# Patient Record
Sex: MALE | Race: WHITE | Employment: FULL TIME | URBAN - NONMETROPOLITAN AREA
[De-identification: names, ages, dates, MRNs, and addresses within clinical notes are randomized per-mention and may not be internally consistent; named-entity substitution may affect disease eponyms.]

---

## 2021-10-02 ENCOUNTER — APPOINTMENT (OUTPATIENT)
Dept: GENERAL RADIOLOGY | Age: 36
DRG: 177 | End: 2021-10-02
Payer: COMMERCIAL

## 2021-10-02 ENCOUNTER — HOSPITAL ENCOUNTER (INPATIENT)
Age: 36
LOS: 2 days | Discharge: HOME | DRG: 177 | End: 2021-10-04
Attending: EMERGENCY MEDICINE | Admitting: OPHTHALMOLOGY
Payer: COMMERCIAL

## 2021-10-02 DIAGNOSIS — U07.1 ACUTE HYPOXEMIC RESPIRATORY FAILURE DUE TO COVID-19 (HCC): Primary | ICD-10-CM

## 2021-10-02 DIAGNOSIS — J96.01 ACUTE HYPOXEMIC RESPIRATORY FAILURE DUE TO COVID-19 (HCC): Primary | ICD-10-CM

## 2021-10-02 LAB
ALBUMIN SERPL-MCNC: 4 G/DL (ref 3.5–5.1)
ALP BLD-CCNC: 57 U/L (ref 38–126)
ALT SERPL-CCNC: 65 U/L (ref 11–66)
ANION GAP SERPL CALCULATED.3IONS-SCNC: 14 MEQ/L (ref 8–16)
AST SERPL-CCNC: 52 U/L (ref 5–40)
BASOPHILS # BLD: 0.4 %
BASOPHILS ABSOLUTE: 0 THOU/MM3 (ref 0–0.1)
BILIRUB SERPL-MCNC: 0.6 MG/DL (ref 0.3–1.2)
BILIRUBIN DIRECT: < 0.2 MG/DL (ref 0–0.3)
BUN BLDV-MCNC: 20 MG/DL (ref 7–22)
C-REACTIVE PROTEIN: 8.99 MG/DL (ref 0–1)
CALCIUM SERPL-MCNC: 9.2 MG/DL (ref 8.5–10.5)
CHLORIDE BLD-SCNC: 97 MEQ/L (ref 98–111)
CO2: 25 MEQ/L (ref 23–33)
CREAT SERPL-MCNC: 0.9 MG/DL (ref 0.4–1.2)
EKG ATRIAL RATE: 104 BPM
EKG P AXIS: 38 DEGREES
EKG P-R INTERVAL: 144 MS
EKG Q-T INTERVAL: 344 MS
EKG QRS DURATION: 98 MS
EKG QTC CALCULATION (BAZETT): 452 MS
EKG R AXIS: -13 DEGREES
EKG T AXIS: 5 DEGREES
EKG VENTRICULAR RATE: 104 BPM
EOSINOPHIL # BLD: 0 %
EOSINOPHILS ABSOLUTE: 0 THOU/MM3 (ref 0–0.4)
ERYTHROCYTE [DISTWIDTH] IN BLOOD BY AUTOMATED COUNT: 12.3 % (ref 11.5–14.5)
ERYTHROCYTE [DISTWIDTH] IN BLOOD BY AUTOMATED COUNT: 41.6 FL (ref 35–45)
FERRITIN: 2468 NG/ML (ref 22–322)
FLU A ANTIGEN: NEGATIVE
FLU B ANTIGEN: NEGATIVE
GFR SERPL CREATININE-BSD FRML MDRD: > 90 ML/MIN/1.73M2
GLUCOSE BLD-MCNC: 211 MG/DL (ref 70–108)
GLUCOSE BLD-MCNC: 212 MG/DL (ref 70–108)
GLUCOSE BLD-MCNC: 337 MG/DL (ref 70–108)
HCT VFR BLD CALC: 43.2 % (ref 42–52)
HEMOGLOBIN: 14.9 GM/DL (ref 14–18)
IMMATURE GRANS (ABS): 0.03 THOU/MM3 (ref 0–0.07)
IMMATURE GRANULOCYTES: 0.4 %
LACTIC ACID: 1.3 MMOL/L (ref 0.5–2)
LD: 407 U/L (ref 100–190)
LYMPHOCYTES # BLD: 13 %
LYMPHOCYTES ABSOLUTE: 1 THOU/MM3 (ref 1–4.8)
MCH RBC QN AUTO: 31.5 PG (ref 26–33)
MCHC RBC AUTO-ENTMCNC: 34.5 GM/DL (ref 32.2–35.5)
MCV RBC AUTO: 91.3 FL (ref 80–94)
MONOCYTES # BLD: 6.5 %
MONOCYTES ABSOLUTE: 0.5 THOU/MM3 (ref 0.4–1.3)
NUCLEATED RED BLOOD CELLS: 0 /100 WBC
OSMOLALITY CALCULATION: 280.9 MOSMOL/KG (ref 275–300)
PLATELET # BLD: 184 THOU/MM3 (ref 130–400)
PMV BLD AUTO: 9.3 FL (ref 9.4–12.4)
POTASSIUM REFLEX MAGNESIUM: 3.6 MEQ/L (ref 3.5–5.2)
PRO-BNP: < 5 PG/ML (ref 0–450)
PROCALCITONIN: 0.13 NG/ML (ref 0.01–0.09)
RBC # BLD: 4.73 MILL/MM3 (ref 4.7–6.1)
SARS-COV-2, NAAT: DETECTED
SARS-COV-2, NAAT: NOT DETECTED
SEG NEUTROPHILS: 79.7 %
SEGMENTED NEUTROPHILS ABSOLUTE COUNT: 6.1 THOU/MM3 (ref 1.8–7.7)
SODIUM BLD-SCNC: 136 MEQ/L (ref 135–145)
SOURCE: NORMAL
TOTAL PROTEIN: 7.3 G/DL (ref 6.1–8)
TROPONIN T: < 0.01 NG/ML
WBC # BLD: 7.6 THOU/MM3 (ref 4.8–10.8)

## 2021-10-02 PROCEDURE — 6370000000 HC RX 637 (ALT 250 FOR IP): Performed by: PHYSICIAN ASSISTANT

## 2021-10-02 PROCEDURE — XW0DXM6 INTRODUCTION OF BARICITINIB INTO MOUTH AND PHARYNX, EXTERNAL APPROACH, NEW TECHNOLOGY GROUP 6: ICD-10-PCS | Performed by: PHYSICIAN ASSISTANT

## 2021-10-02 PROCEDURE — 83605 ASSAY OF LACTIC ACID: CPT

## 2021-10-02 PROCEDURE — 87635 SARS-COV-2 COVID-19 AMP PRB: CPT

## 2021-10-02 PROCEDURE — 6360000002 HC RX W HCPCS: Performed by: EMERGENCY MEDICINE

## 2021-10-02 PROCEDURE — 83615 LACTATE (LD) (LDH) ENZYME: CPT

## 2021-10-02 PROCEDURE — 2580000003 HC RX 258: Performed by: EMERGENCY MEDICINE

## 2021-10-02 PROCEDURE — 86140 C-REACTIVE PROTEIN: CPT

## 2021-10-02 PROCEDURE — 84484 ASSAY OF TROPONIN QUANT: CPT

## 2021-10-02 PROCEDURE — 80076 HEPATIC FUNCTION PANEL: CPT

## 2021-10-02 PROCEDURE — 93010 ELECTROCARDIOGRAM REPORT: CPT | Performed by: INTERNAL MEDICINE

## 2021-10-02 PROCEDURE — 99283 EMERGENCY DEPT VISIT LOW MDM: CPT

## 2021-10-02 PROCEDURE — 83880 ASSAY OF NATRIURETIC PEPTIDE: CPT

## 2021-10-02 PROCEDURE — 80048 BASIC METABOLIC PNL TOTAL CA: CPT

## 2021-10-02 PROCEDURE — 93005 ELECTROCARDIOGRAM TRACING: CPT | Performed by: EMERGENCY MEDICINE

## 2021-10-02 PROCEDURE — 96361 HYDRATE IV INFUSION ADD-ON: CPT

## 2021-10-02 PROCEDURE — 87804 INFLUENZA ASSAY W/OPTIC: CPT

## 2021-10-02 PROCEDURE — 99222 1ST HOSP IP/OBS MODERATE 55: CPT | Performed by: PHYSICIAN ASSISTANT

## 2021-10-02 PROCEDURE — 82948 REAGENT STRIP/BLOOD GLUCOSE: CPT

## 2021-10-02 PROCEDURE — 85025 COMPLETE CBC W/AUTO DIFF WBC: CPT

## 2021-10-02 PROCEDURE — 2580000003 HC RX 258: Performed by: PHYSICIAN ASSISTANT

## 2021-10-02 PROCEDURE — 96374 THER/PROPH/DIAG INJ IV PUSH: CPT

## 2021-10-02 PROCEDURE — 36415 COLL VENOUS BLD VENIPUNCTURE: CPT

## 2021-10-02 PROCEDURE — 82728 ASSAY OF FERRITIN: CPT

## 2021-10-02 PROCEDURE — 71045 X-RAY EXAM CHEST 1 VIEW: CPT

## 2021-10-02 PROCEDURE — 1200000003 HC TELEMETRY R&B

## 2021-10-02 PROCEDURE — 84145 PROCALCITONIN (PCT): CPT

## 2021-10-02 PROCEDURE — 6360000002 HC RX W HCPCS: Performed by: PHYSICIAN ASSISTANT

## 2021-10-02 RX ORDER — DEXAMETHASONE SODIUM PHOSPHATE 4 MG/ML
10 INJECTION, SOLUTION INTRA-ARTICULAR; INTRALESIONAL; INTRAMUSCULAR; INTRAVENOUS; SOFT TISSUE ONCE
Status: COMPLETED | OUTPATIENT
Start: 2021-10-02 | End: 2021-10-02

## 2021-10-02 RX ORDER — AMLODIPINE BESYLATE 5 MG/1
5 TABLET ORAL DAILY
COMMUNITY

## 2021-10-02 RX ORDER — VITAMIN B COMPLEX
2000 TABLET ORAL DAILY
Status: DISCONTINUED | OUTPATIENT
Start: 2021-10-02 | End: 2021-10-04 | Stop reason: HOSPADM

## 2021-10-02 RX ORDER — ESCITALOPRAM OXALATE 20 MG/1
30 TABLET ORAL NIGHTLY
COMMUNITY

## 2021-10-02 RX ORDER — SODIUM CHLORIDE, SODIUM LACTATE, POTASSIUM CHLORIDE, CALCIUM CHLORIDE 600; 310; 30; 20 MG/100ML; MG/100ML; MG/100ML; MG/100ML
INJECTION, SOLUTION INTRAVENOUS CONTINUOUS
Status: DISCONTINUED | OUTPATIENT
Start: 2021-10-02 | End: 2021-10-04 | Stop reason: HOSPADM

## 2021-10-02 RX ORDER — POLYETHYLENE GLYCOL 3350 17 G/17G
17 POWDER, FOR SOLUTION ORAL DAILY PRN
Status: DISCONTINUED | OUTPATIENT
Start: 2021-10-02 | End: 2021-10-04 | Stop reason: HOSPADM

## 2021-10-02 RX ORDER — ONDANSETRON 2 MG/ML
4 INJECTION INTRAMUSCULAR; INTRAVENOUS EVERY 6 HOURS PRN
Status: DISCONTINUED | OUTPATIENT
Start: 2021-10-02 | End: 2021-10-04 | Stop reason: HOSPADM

## 2021-10-02 RX ORDER — NICOTINE POLACRILEX 4 MG
15 LOZENGE BUCCAL PRN
Status: DISCONTINUED | OUTPATIENT
Start: 2021-10-02 | End: 2021-10-04 | Stop reason: HOSPADM

## 2021-10-02 RX ORDER — ZINC SULFATE 50(220)MG
50 CAPSULE ORAL DAILY
Status: DISCONTINUED | OUTPATIENT
Start: 2021-10-02 | End: 2021-10-04 | Stop reason: HOSPADM

## 2021-10-02 RX ORDER — ASCORBIC ACID 500 MG
500 TABLET ORAL DAILY
Status: DISCONTINUED | OUTPATIENT
Start: 2021-10-02 | End: 2021-10-04 | Stop reason: HOSPADM

## 2021-10-02 RX ORDER — ACETAMINOPHEN 650 MG/1
650 SUPPOSITORY RECTAL EVERY 6 HOURS PRN
Status: DISCONTINUED | OUTPATIENT
Start: 2021-10-02 | End: 2021-10-04 | Stop reason: HOSPADM

## 2021-10-02 RX ORDER — SODIUM CHLORIDE 0.9 % (FLUSH) 0.9 %
5-40 SYRINGE (ML) INJECTION EVERY 12 HOURS SCHEDULED
Status: DISCONTINUED | OUTPATIENT
Start: 2021-10-02 | End: 2021-10-04 | Stop reason: HOSPADM

## 2021-10-02 RX ORDER — SODIUM CHLORIDE 9 MG/ML
25 INJECTION, SOLUTION INTRAVENOUS PRN
Status: DISCONTINUED | OUTPATIENT
Start: 2021-10-02 | End: 2021-10-04 | Stop reason: HOSPADM

## 2021-10-02 RX ORDER — ONDANSETRON 4 MG/1
4 TABLET, ORALLY DISINTEGRATING ORAL EVERY 8 HOURS PRN
Status: DISCONTINUED | OUTPATIENT
Start: 2021-10-02 | End: 2021-10-04 | Stop reason: HOSPADM

## 2021-10-02 RX ORDER — DEXTROSE MONOHYDRATE 25 G/50ML
12.5 INJECTION, SOLUTION INTRAVENOUS PRN
Status: DISCONTINUED | OUTPATIENT
Start: 2021-10-02 | End: 2021-10-04 | Stop reason: HOSPADM

## 2021-10-02 RX ORDER — ESCITALOPRAM OXALATE 20 MG/1
20 TABLET ORAL NIGHTLY
Status: DISCONTINUED | OUTPATIENT
Start: 2021-10-02 | End: 2021-10-03

## 2021-10-02 RX ORDER — SODIUM CHLORIDE 9 MG/ML
1000 INJECTION, SOLUTION INTRAVENOUS CONTINUOUS
Status: DISCONTINUED | OUTPATIENT
Start: 2021-10-02 | End: 2021-10-04 | Stop reason: HOSPADM

## 2021-10-02 RX ORDER — SODIUM CHLORIDE 0.9 % (FLUSH) 0.9 %
5-40 SYRINGE (ML) INJECTION PRN
Status: DISCONTINUED | OUTPATIENT
Start: 2021-10-02 | End: 2021-10-04 | Stop reason: HOSPADM

## 2021-10-02 RX ORDER — DEXTROSE MONOHYDRATE 50 MG/ML
100 INJECTION, SOLUTION INTRAVENOUS PRN
Status: DISCONTINUED | OUTPATIENT
Start: 2021-10-02 | End: 2021-10-04 | Stop reason: HOSPADM

## 2021-10-02 RX ORDER — DEXAMETHASONE SODIUM PHOSPHATE 4 MG/ML
6 INJECTION, SOLUTION INTRA-ARTICULAR; INTRALESIONAL; INTRAMUSCULAR; INTRAVENOUS; SOFT TISSUE EVERY 24 HOURS
Status: DISCONTINUED | OUTPATIENT
Start: 2021-10-02 | End: 2021-10-02

## 2021-10-02 RX ORDER — GUAIFENESIN/DEXTROMETHORPHAN 100-10MG/5
5 SYRUP ORAL EVERY 4 HOURS PRN
Status: DISCONTINUED | OUTPATIENT
Start: 2021-10-02 | End: 2021-10-04 | Stop reason: HOSPADM

## 2021-10-02 RX ORDER — ACETAMINOPHEN 325 MG/1
650 TABLET ORAL EVERY 6 HOURS PRN
Status: DISCONTINUED | OUTPATIENT
Start: 2021-10-02 | End: 2021-10-04 | Stop reason: HOSPADM

## 2021-10-02 RX ORDER — LISINOPRIL AND HYDROCHLOROTHIAZIDE 20; 12.5 MG/1; MG/1
1 TABLET ORAL 2 TIMES DAILY
COMMUNITY

## 2021-10-02 RX ADMIN — Medication 5 ML: at 18:11

## 2021-10-02 RX ADMIN — INSULIN LISPRO 2 UNITS: 100 INJECTION, SOLUTION INTRAVENOUS; SUBCUTANEOUS at 19:07

## 2021-10-02 RX ADMIN — BARICITINIB 4 MG: 2 TABLET, FILM COATED ORAL at 18:10

## 2021-10-02 RX ADMIN — Medication 50 MG: at 18:11

## 2021-10-02 RX ADMIN — OXYCODONE HYDROCHLORIDE AND ACETAMINOPHEN 500 MG: 500 TABLET ORAL at 18:10

## 2021-10-02 RX ADMIN — ENOXAPARIN SODIUM 30 MG: 30 INJECTION, SOLUTION INTRAVENOUS; SUBCUTANEOUS at 21:57

## 2021-10-02 RX ADMIN — SODIUM CHLORIDE 1000 ML: 9 INJECTION, SOLUTION INTRAVENOUS at 13:31

## 2021-10-02 RX ADMIN — DEXAMETHASONE SODIUM PHOSPHATE 10 MG: 4 INJECTION, SOLUTION INTRAMUSCULAR; INTRAVENOUS at 13:31

## 2021-10-02 RX ADMIN — DEXAMETHASONE SODIUM PHOSPHATE 20 MG: 4 INJECTION, SOLUTION INTRAMUSCULAR; INTRAVENOUS at 18:10

## 2021-10-02 RX ADMIN — VITAMIN D, TAB 1000IU (100/BT) 2000 UNITS: 25 TAB at 18:11

## 2021-10-02 RX ADMIN — INSULIN LISPRO 2 UNITS: 100 INJECTION, SOLUTION INTRAVENOUS; SUBCUTANEOUS at 21:59

## 2021-10-02 ASSESSMENT — PAIN SCALES - GENERAL
PAINLEVEL_OUTOF10: 0
PAINLEVEL_OUTOF10: 0

## 2021-10-02 ASSESSMENT — ENCOUNTER SYMPTOMS
SORE THROAT: 0
RHINORRHEA: 0
CONSTIPATION: 0
SHORTNESS OF BREATH: 1
NAUSEA: 0
DIARRHEA: 1
ABDOMINAL PAIN: 0
VOMITING: 0
COUGH: 1

## 2021-10-02 NOTE — ED PROVIDER NOTES
Katy Parmar 13 COMPLAINT       Chief Complaint   Patient presents with    Shortness of Breath    Concern For COVID-19       Nurses Notes reviewed and I agree except as noted in the HPI. HISTORY OF PRESENT ILLNESS    Natasha Feliz is a 43 y.o. pleasant male who presents to the emergency department for shortness of breath and suspected Covid. Patient reports that 10 days ago he began to develop symptoms. He spoke with his PCP on call over the phone who recommended coming to the emergency department. He has had ongoing low-grade fever, has been taking Tylenol and ibuprofen alternating. Last few days he has had worsening of his difficulty breathing, has been coughing up more phlegm. Wife states pulse oximeter this morning was reading 85% at home. He has also had ongoing body aches, discomfort of cough, and felt sweaty. Denies nausea, vomiting, lymphadenopathy, rash. He has not been vaccinated for COVID-19. He has been having ongoing diarrhea. His father was found to be positive for Covid on September 20 when he was around his father on September 19. He has not been tested for COVID-19. No other additional complaints or concerns. REVIEW OF SYSTEMS     Review of Systems   Constitutional: Positive for fatigue and fever. Negative for diaphoresis. HENT: Negative for congestion, rhinorrhea and sore throat. Eyes: Negative for visual disturbance. Respiratory: Positive for cough and shortness of breath. Cardiovascular: Negative for chest pain, palpitations and leg swelling. Gastrointestinal: Positive for diarrhea. Negative for abdominal pain, constipation, nausea and vomiting. Endocrine: Negative for polyuria. Genitourinary: Negative for dysuria. Musculoskeletal: Positive for myalgias. Negative for joint swelling. Skin: Negative for rash.    Neurological: Negative for dizziness, seizures, syncope, speech difficulty, weakness, numbness and headaches. Hematological: Negative for adenopathy. Psychiatric/Behavioral: Negative for confusion and self-injury. All other systems reviewed and are negative. PAST MEDICAL HISTORY    has no past medical history on file. SURGICAL HISTORY      has a past surgical history that includes [place] tooth extraction. CURRENT MEDICATIONS       Previous Medications    No medications on file       ALLERGIES     has No Known Allergies. FAMILY HISTORY     He indicated that his mother is alive. He indicated that his father is alive. family history includes Diabetes in his paternal grandmother; Heart Disease in his paternal grandfather; Hypertension in his father. SOCIAL HISTORY      reports that he has been smoking. He does not have any smokeless tobacco history on file. He reports current alcohol use. He reports that he does not use drugs. PHYSICAL EXAM     INITIAL VITALS:  weight is 260 lb (117.9 kg). His oral temperature is 98.4 °F (36.9 °C). His blood pressure is 104/78 and his pulse is 97. His respiration is 22 and oxygen saturation is 93%. CONSTITUTIONAL: [Awake, alert, non toxic, well developed, well nourished, no acute distress, with pulling himself forward for exam, patient desats on NC to 88%, sitting still sats are 93% on NC]  HEAD: [Normocephalic, atraumatic]  EYES: [Pupils equal, round & reactive to light, extraocular movements intact, no nystagmus, clear conjunctiva, non-icteric sclera]  ENT: [External ear canal clear without evidence of cerumen impaction or foreign body, TM's clear without erythema or bulging. Nares patent without drainage, septum appears midline. Moist mucus membranes, oropharynx clear without exudate, erythema, or mass. Uvula midline]  NECK: [Nontender and supple. No meningismus, no appreciated lymphadenopathy. Intact full range of motion. C-spine midline without vertebral tenderness.  Trachea midline.]  CHEST: [Inspection normal, no lesions, equal minor errors which are inherent in voice recognition technology. **      Final report electronically signed by Dr. Javier Sanchez MD on 10/2/2021 12:31 PM        [] Visualized and interpreted by me   [x] Radiologist's Wet Read Report Reviewed   [] Discussed withRadiologist.    LABS:   Labs Reviewed   BASIC METABOLIC PANEL W/ REFLEX TO MG FOR LOW K - Abnormal; Notable for the following components:       Result Value    Chloride 97 (*)     Glucose 212 (*)     All other components within normal limits   CBC WITH AUTO DIFFERENTIAL - Abnormal; Notable for the following components:    MPV 9.3 (*)     All other components within normal limits   FERRITIN - Abnormal; Notable for the following components:    Ferritin 2,468 (*)     All other components within normal limits   C-REACTIVE PROTEIN - Abnormal; Notable for the following components:    CRP 8.99 (*)     All other components within normal limits   HEPATIC FUNCTION PANEL - Abnormal; Notable for the following components:    AST 52 (*)     All other components within normal limits   COVID-19, RAPID   RAPID INFLUENZA A/B ANTIGENS   BRAIN NATRIURETIC PEPTIDE   TROPONIN   ANION GAP   OSMOLALITY   GLOMERULAR FILTRATION RATE, ESTIMATED   LACTATE DEHYDROGENASE   COVID-19       EMERGENCY DEPARTMENT COURSE:   Vitals:    Vitals:    10/02/21 1130 10/02/21 1141 10/02/21 1336   BP:  110/67 104/78   Pulse:  104 97   Resp:  26 22   Temp:  98.4 °F (36.9 °C)    TempSrc:  Oral    SpO2: (!) 89% 92% 93%   Weight:  260 lb (117.9 kg)        The results of pertinent diagnostic studies and exam findings were discussed. The patients provisional diagnosis and plan of care were discussed with the patient and present family. The patient and/or present family expressed understanding of the diagnosis and plan. While asking patient to sit forward in the bed on NC oxygen, he desats to high 80's. While sitting still on NC his sats remain >90%.     CRITICAL CARE:   None    CONSULTS:  Hospitalist for admit    PROCEDURES:  None    FINAL IMPRESSION      1. Acute hypoxemic respiratory failure due to COVID-19 [facility])          DISPOSITION/PLAN   admit    PATIENT REFERRED TO:  No follow-up provider specified. DISCHARGE MEDICATIONS:  New Prescriptions    No medications on file       (Please note that portions of this note were completed with a voice recognition program.  Efforts were made to edit the dictations but occasionally words are mis-transcribed.)    Provider:  I personally performed the services described in the documentation, reviewed and edited the documentation which was dictated, and it accurately records my words and actions.     Vishal Hirsch MD 10/2/21 4:42 PM                Vishal Hirsch MD  10/02/21 1023

## 2021-10-02 NOTE — ED NOTES
ED to inpatient nurses report    Chief Complaint   Patient presents with    Shortness of Breath    Concern For COVID-19      Present to ED from home  LOC: alert and orientated to name, place, date  Vital signs   Vitals:    10/02/21 1130 10/02/21 1141 10/02/21 1336   BP:  110/67 104/78   Pulse:  104 97   Resp:  26 22   Temp:  98.4 °F (36.9 °C)    TempSrc:  Oral    SpO2: (!) 89% 92% 93%   Weight:  260 lb (117.9 kg)       Oxygen Baseline RA    Current needs required 4L NC Bipap/Cpap No  LDAs:   Peripheral IV 10/02/21 Left;Proximal;Anterior Forearm (Active)     Mobility: Independent  Pending ED orders: NA  Present condition: STABLE    Electronically signed by Ariela Hill, RN on 10/2/2021 at 3:00 PM       Liang Hope RN  10/02/21 1500

## 2021-10-02 NOTE — H&P
Hospitalist History and Physical          Patient: Jing Hernández  : 1985  MRN: [id number]     Acct: [de-identified]    PCP: Kimberley Austin  Date of Admission: 10/2/2021  Date of Service: Pt seen/examined on 10/02/21  and Admitted to Inpatient with expected LOS greater than two midnights due to medical therapy. Assessment and Plan:    Acute hypoxemic respiratory failure due 2/2 probable COVID-19 PNA:  Initial test was negative: repeat test ordered 2/2 suspicious of false negative  Troponin WNML, flu negative   CXR shows viral PNA picture, Ferritin 2,460  Currently on 2L NC   CRP 8.99- Baricitinib 4mg ordered  Decadron 6 mg IV   Vitamin D, Vitamin C, Zinc and IVF   Encourage acapella and incentive spirometer   Procalictonin, LDH, lactate- pending     Type II Diabetes: Oral medications held; Low SSI ordered; Carb control diet     Essential Hypertension, controlled: Resume Home medications     DISHA: Resume home medications if currently taking    Obesity: 35.26 kg/m2 - increased risk of [id number] complications        =======================================================================      Chief Complaint:  Shortness of breath     History Of Present Illness:  Jing Hernández is a 43 y.o. male with PMHx of type II diabetes, Essential hypertension, DISHA who presents to [facility] with shortness of breath and consitutional symptoms reflecting COVID19. Patient states he was around his father on   who was diagnosed positive on the . Patient began to experience low grade fevers, cough, headache, myalgias, nausea and diarrhea starting on the . His wife reports he was starting to feel better but today his oxygen saturation 85% on RA and was advised to come to the ED per his PCP. He states he's been taking tylenol and ibuprofen to manage his symptoms. He reports not being vaccinated for [address]. His cough is mildly productive with no hemoptysis.  He denies blurry vision, chest pain, hematuria, chills, numbness or tingling or dizziness. Past Medical History:    No past medical history on file. Past Surgical History:        Procedure Laterality Date    WISDOM TOOTH EXTRACTION         Medications Prior to Admission:   Prior to Admission medications    Not on File       Allergies:  Patient has no known allergies. Social History:    The patient currently lives    Tobacco use:   reports that he has been smoking. He does not have any smokeless tobacco history on file. Alcohol use:   reports current alcohol use. Drug use:  reports no history of drug use. Family History:  as follows:      Problem Relation Age of Onset    Diabetes Paternal Grandmother     Heart Disease Paternal Grandfather     Hypertension Father        Review of Systems:   Pertinent positives and negatives as noted in the HPI. All other systems reviewed and negative. Physical Exam:    /78   Pulse 97   Temp 98.4 °F (36.9 °C) (Oral)   Resp 22   Wt 260 lb (117.9 kg)   SpO2 93%   BMI 35.26 kg/m²       General appearance: No apparent distress, well developed, appears stated age. Eyes:  Pupils equal, round, and reactive to light. Conjunctivae/corneas clear. HENT: Head normal in appearance. External nares normal.  Oral mucosa moist without lesions. Hearing grossly intact. Neck: Supple, with full range of motion. Trachea midline. No gross JVD appreciated. Respiratory:  2L NC increased respiratory effort. Diminished breath sounds in lower lung fields with some intermitent rales on right side   Cardiovascular: Normal rate, regular rhythm with normal S1/S2 without murmurs. No lower extremity edema. Abdomen: Soft, non-tender, non-distended . Musculoskeletal: There is no joint swelling or tenderness. Normal tone. No abnormal movements. Skin: Warm and dry. No rashes or lesions. Neurologic:  No focal sensory/motor deficits in the upper and lower extremities.  Cranial nerves:  grossly non-focal 2-12.     Psychiatric: Alert and oriented, normal insight and thought content. Capillary Refill: Brisk,< 3 seconds. Peripheral Pulses: +2 palpable, equal bilaterally. Labs:     Recent Labs     10/02/21  1159   WBC 7.6   HGB 14.9   HCT 43.2        Recent Labs     10/02/21  1159      K 3.6   CL 97*   CO2 25   BUN 20   CREATININE 0.9   CALCIUM 9.2     Recent Labs     10/02/21  1159   AST 52*   ALT 65   BILIDIR <0.2   BILITOT 0.6   ALKPHOS 57     No results for input(s): INR in the last 72 hours. No results for input(s): Patrisha Meigs in the last 72 hours. No results found for: Klarissa Midget, BACTERIA, RBCUA, BLOODU, [address], [address]      Radiology:     XR CHEST PORTABLE   Final Result   Peripheral patchy opacities as evidence for atypical infectious/inflammatory process to include viral pneumonia. **This report has been created using voice recognition software. It may contain minor errors which are inherent in voice recognition technology. **      Final report electronically signed by Dr. Joel Pierre MD on 10/2/2021 12:31 PM             EKG:  I have reviewed the EKG with the following interpretation:   Normal Sinus Rhythm with ST depression in inferior leads       PT/OT Eval Status: n/a  Diet: Regular diet Carb control   DVT prophylaxis: 30 mg Lovenox   Code Status: Full code  Disposition: admit to isolation     Thank you Desmond Flowers for the opportunity to be involved in this patient's care.     Electronically signed by Elvis Montemayor [place] on 10/2/2021 at 3:14 PM.

## 2021-10-03 LAB
BASOPHILS # BLD: 0.2 %
BASOPHILS ABSOLUTE: 0 THOU/MM3 (ref 0–0.1)
EOSINOPHIL # BLD: 0 %
EOSINOPHILS ABSOLUTE: 0 THOU/MM3 (ref 0–0.4)
ERYTHROCYTE [DISTWIDTH] IN BLOOD BY AUTOMATED COUNT: 12.2 % (ref 11.5–14.5)
ERYTHROCYTE [DISTWIDTH] IN BLOOD BY AUTOMATED COUNT: 42.4 FL (ref 35–45)
GLUCOSE BLD-MCNC: 223 MG/DL (ref 70–108)
GLUCOSE BLD-MCNC: 243 MG/DL (ref 70–108)
GLUCOSE BLD-MCNC: 295 MG/DL (ref 70–108)
GLUCOSE BLD-MCNC: 347 MG/DL (ref 70–108)
HCT VFR BLD CALC: 45 % (ref 42–52)
HEMOGLOBIN: 15 GM/DL (ref 14–18)
IMMATURE GRANS (ABS): 0.06 THOU/MM3 (ref 0–0.07)
IMMATURE GRANULOCYTES: 1.5 %
LYMPHOCYTES # BLD: 26.4 %
LYMPHOCYTES ABSOLUTE: 1.1 THOU/MM3 (ref 1–4.8)
MCH RBC QN AUTO: 31.3 PG (ref 26–33)
MCHC RBC AUTO-ENTMCNC: 33.3 GM/DL (ref 32.2–35.5)
MCV RBC AUTO: 93.9 FL (ref 80–94)
MONOCYTES # BLD: 7 %
MONOCYTES ABSOLUTE: 0.3 THOU/MM3 (ref 0.4–1.3)
NUCLEATED RED BLOOD CELLS: 0 /100 WBC
PLATELET # BLD: 212 THOU/MM3 (ref 130–400)
PLATELET ESTIMATE: ADEQUATE
PMV BLD AUTO: 10.1 FL (ref 9.4–12.4)
RBC # BLD: 4.79 MILL/MM3 (ref 4.7–6.1)
SCAN OF BLOOD SMEAR: NORMAL
SEG NEUTROPHILS: 64.9 %
SEGMENTED NEUTROPHILS ABSOLUTE COUNT: 2.6 THOU/MM3 (ref 1.8–7.7)
WBC # BLD: 4 THOU/MM3 (ref 4.8–10.8)

## 2021-10-03 PROCEDURE — 6370000000 HC RX 637 (ALT 250 FOR IP): Performed by: PHYSICIAN ASSISTANT

## 2021-10-03 PROCEDURE — 6360000002 HC RX W HCPCS: Performed by: PHYSICIAN ASSISTANT

## 2021-10-03 PROCEDURE — 94669 MECHANICAL CHEST WALL OSCILL: CPT

## 2021-10-03 PROCEDURE — 86480 TB TEST CELL IMMUN MEASURE: CPT

## 2021-10-03 PROCEDURE — 2580000003 HC RX 258: Performed by: PHYSICIAN ASSISTANT

## 2021-10-03 PROCEDURE — 85025 COMPLETE CBC W/AUTO DIFF WBC: CPT

## 2021-10-03 PROCEDURE — 36415 COLL VENOUS BLD VENIPUNCTURE: CPT

## 2021-10-03 PROCEDURE — 1200000003 HC TELEMETRY R&B

## 2021-10-03 PROCEDURE — 99232 SBSQ HOSP IP/OBS MODERATE 35: CPT | Performed by: PHYSICIAN ASSISTANT

## 2021-10-03 PROCEDURE — 82948 REAGENT STRIP/BLOOD GLUCOSE: CPT

## 2021-10-03 PROCEDURE — 6370000000 HC RX 637 (ALT 250 FOR IP): Performed by: HOSPITALIST

## 2021-10-03 RX ORDER — LISINOPRIL AND HYDROCHLOROTHIAZIDE 20; 12.5 MG/1; MG/1
1 TABLET ORAL 2 TIMES DAILY
Status: DISCONTINUED | OUTPATIENT
Start: 2021-10-03 | End: 2021-10-03 | Stop reason: SDUPTHER

## 2021-10-03 RX ORDER — LISINOPRIL 20 MG/1
20 TABLET ORAL 2 TIMES DAILY
Status: DISCONTINUED | OUTPATIENT
Start: 2021-10-03 | End: 2021-10-04 | Stop reason: HOSPADM

## 2021-10-03 RX ORDER — HYDROCHLOROTHIAZIDE 12.5 MG/1
12.5 CAPSULE, GELATIN COATED ORAL 2 TIMES DAILY
Status: DISCONTINUED | OUTPATIENT
Start: 2021-10-03 | End: 2021-10-04 | Stop reason: HOSPADM

## 2021-10-03 RX ORDER — AMLODIPINE BESYLATE 5 MG/1
5 TABLET ORAL DAILY
Status: DISCONTINUED | OUTPATIENT
Start: 2021-10-03 | End: 2021-10-04 | Stop reason: HOSPADM

## 2021-10-03 RX ADMIN — HYDROCHLOROTHIAZIDE 12.5 MG: 12.5 CAPSULE ORAL at 15:55

## 2021-10-03 RX ADMIN — VITAMIN D, TAB 1000IU (100/BT) 2000 UNITS: 25 TAB at 07:58

## 2021-10-03 RX ADMIN — Medication 5 ML: at 08:00

## 2021-10-03 RX ADMIN — BARICITINIB 4 MG: 2 TABLET, FILM COATED ORAL at 07:58

## 2021-10-03 RX ADMIN — ESCITALOPRAM OXALATE 20 MG: 20 TABLET, FILM COATED ORAL at 00:29

## 2021-10-03 RX ADMIN — SODIUM CHLORIDE, POTASSIUM CHLORIDE, SODIUM LACTATE AND CALCIUM CHLORIDE: 600; 310; 30; 20 INJECTION, SOLUTION INTRAVENOUS at 14:16

## 2021-10-03 RX ADMIN — DEXAMETHASONE SODIUM PHOSPHATE 20 MG: 4 INJECTION, SOLUTION INTRAMUSCULAR; INTRAVENOUS at 17:48

## 2021-10-03 RX ADMIN — SODIUM CHLORIDE, POTASSIUM CHLORIDE, SODIUM LACTATE AND CALCIUM CHLORIDE: 600; 310; 30; 20 INJECTION, SOLUTION INTRAVENOUS at 00:19

## 2021-10-03 RX ADMIN — ENOXAPARIN SODIUM 30 MG: 30 INJECTION, SOLUTION INTRAVENOUS; SUBCUTANEOUS at 21:08

## 2021-10-03 RX ADMIN — ESCITALOPRAM OXALATE 30 MG: 10 TABLET, FILM COATED ORAL at 21:10

## 2021-10-03 RX ADMIN — INSULIN LISPRO 6 UNITS: 100 INJECTION, SOLUTION INTRAVENOUS; SUBCUTANEOUS at 13:11

## 2021-10-03 RX ADMIN — Medication 50 MG: at 07:58

## 2021-10-03 RX ADMIN — AMLODIPINE BESYLATE 5 MG: 5 TABLET ORAL at 15:36

## 2021-10-03 RX ADMIN — ENOXAPARIN SODIUM 30 MG: 30 INJECTION, SOLUTION INTRAVENOUS; SUBCUTANEOUS at 08:00

## 2021-10-03 RX ADMIN — INSULIN LISPRO 2 UNITS: 100 INJECTION, SOLUTION INTRAVENOUS; SUBCUTANEOUS at 09:33

## 2021-10-03 RX ADMIN — LISINOPRIL 20 MG: 20 TABLET ORAL at 15:55

## 2021-10-03 RX ADMIN — OXYCODONE HYDROCHLORIDE AND ACETAMINOPHEN 500 MG: 500 TABLET ORAL at 07:58

## 2021-10-03 RX ADMIN — INSULIN LISPRO 6 UNITS: 100 INJECTION, SOLUTION INTRAVENOUS; SUBCUTANEOUS at 21:23

## 2021-10-03 RX ADMIN — INSULIN LISPRO 9 UNITS: 100 INJECTION, SOLUTION INTRAVENOUS; SUBCUTANEOUS at 17:54

## 2021-10-03 ASSESSMENT — PAIN SCALES - GENERAL
PAINLEVEL_OUTOF10: 0

## 2021-10-03 NOTE — PROGRESS NOTES
Hospitalist Progress Note      Patient:  Terese Perez    Unit/Bed:8B-37/037-A  YOB: 1985  MRN: [id number]   Acct: [de-identified]   PCP: Chay Dwyer  Date of Admission: 10/2/2021    Assessment/Plan:    COVID 19: Patient tested positive for COVID-19 on 10/2. Pt met inpatient criteria. CRP level is 8.99. Due to his respiratory status and CRP level, patient is a candidate for baricitinib & decadron. Pharmacy consulted to help with dosages of these medications. Vitamins & supplements started. Acute respiratory failure, hypoxia: Patient's baseline is room air. Patient is currently requiring 3 L of oxygen. Incentive spirometry. Acapella. CXR shows \" peripheral patchy opacities\". Essential hypertension: Blood pressure is stable. Continue home medications. Noninsulin-dependent diabetes: Blood sugar has been elevated secondary to steroids. High dose SSI. Chief Complaint: Shortness of breath    Initial H and P:-    Initial H&P Bruna Ziegler is a 43 y.o. male with PMHx of type II diabetes, Essential hypertension, DISHA who presents to [facility] with shortness of breath and consitutional symptoms reflecting COVID19. Patient states he was around his father on September 19  who was diagnosed positive on the 20th. Patient began to experience low grade fevers, cough, headache, myalgias, nausea and diarrhea starting on the 24th. His wife reports he was starting to feel better but today his oxygen saturation 85% on RA and was advised to come to the ED per his PCP. He states he's been taking tylenol and ibuprofen to manage his symptoms. He reports not being vaccinated for [address]. His cough is mildly productive with no hemoptysis. He denies blurry vision, chest pain, hematuria, chills, numbness or tingling or dizziness. \"     Subjective (past 24 hours):   No acute events overnight. Patient sitting up in chair upon evaluation.   Patient and rhythm with normal S1/S2 without murmurs, rubs or gallops. Abdomen: Soft, non-tender, non-distended with normal bowel sounds. Musculoskeletal: passive and active ROM x 4 extremities. Skin: Skin color, texture, turgor normal.  No rashes or lesions. Neurologic:  Neurovascularly intact without any focal sensory/motor deficits. Cranial nerves: II-XII intact, grossly non-focal.  Psychiatric: Alert and oriented, thought content appropriate, normal insight  Capillary Refill: Brisk,< 3 seconds   Peripheral Pulses: +2 palpable, equal bilaterally     Labs:   Recent Labs     10/02/21  1159 10/03/21  0647   WBC 7.6 4.0*   HGB 14.9 15.0   HCT 43.2 45.0    212     Recent Labs     10/02/21  1159      K 3.6   CL 97*   CO2 25   BUN 20   CREATININE 0.9   CALCIUM 9.2     Recent Labs     10/02/21  1159   AST 52*   ALT 65   BILIDIR <0.2   BILITOT 0.6   ALKPHOS 57     Radiology:  XR CHEST PORTABLE   Final Result   Peripheral patchy opacities as evidence for atypical infectious/inflammatory process to include viral pneumonia. **This report has been created using voice recognition software. It may contain minor errors which are inherent in voice recognition technology. **      Final report electronically signed by Dr. Kirk Coleman MD on 10/2/2021 12:31 PM        Electronically signed by MARLY Blancas on 10/3/2021 at 1:17 PM

## 2021-10-03 NOTE — ACP (ADVANCE CARE PLANNING)
Advance Care Planning     Advance Care Planning Inpatient Note  [facility] Department    Today's Date: 10/3/2021  Unit: [facility] 8B    Received request from rounding. Upon review of chart and communication with care team, patient's decision making abilities are not in question. . Patient was/were present in the room during visit. Goals of ACP Conversation:  Discuss advance care planning documents  Facilitate a discussion related to patient's goals of care as they align with the patient's values and beliefs. Health Care Decision Makers:       Primary Decision Maker: [address] [place] [address] - [phone]    Summary:  Documented Next of Kin, per patient report    Advance Care Planning Documents (Patient Wishes):  None     Assessment:   held ACP conversation with pt. Pt declined to complete AD forms, but stated his wife would be his decision maker. Pt would want everything done to save his life. Pt has 4 young children at home ages 11,9,3 and 1. Pt's wife is a hospice nurse and pt stated they have shared their wishes with each other about end of life care. Pt is a member of [employer] and pt's chart has been updated to reflect this.  brought pt a rosary. Interventions:  Provided education on documents for clarity and greater understanding  Discussed and provided education on state decision maker hierarchy  Encouraged ongoing ACP conversation with future decision makers and loved ones  Reviewed but did not complete ACP document    Care Preferences Communicated:   Ventilation:   If the patient, in their present state of health, suddenly became very ill and unable to breathe on their own,     the patient would desire the use of a ventilator (breathing machine). If their health worsens and it becomes clear that the change of recovery is unlikely,     the patient would desire the use of a ventilator (breathing machine).     Resuscitation:  In the event the patient's heart stopped as a result of an underlying serious health condition, the patient communicates a preference for      resuscitative attempts (CPR). Outcomes/Plan:  Pt declined to complete AD forms at this time.     Electronically signed by Chaplain Gustabo on 10/3/2021 at 6:22 PM

## 2021-10-04 VITALS
OXYGEN SATURATION: 95 % | TEMPERATURE: 98 F | RESPIRATION RATE: 18 BRPM | SYSTOLIC BLOOD PRESSURE: 116 MMHG | HEART RATE: 79 BPM | WEIGHT: 260 LBS | BODY MASS INDEX: 35.26 KG/M2 | DIASTOLIC BLOOD PRESSURE: 72 MMHG

## 2021-10-04 LAB
GLUCOSE BLD-MCNC: 216 MG/DL (ref 70–108)
GLUCOSE BLD-MCNC: 288 MG/DL (ref 70–108)

## 2021-10-04 PROCEDURE — 82948 REAGENT STRIP/BLOOD GLUCOSE: CPT

## 2021-10-04 PROCEDURE — 6370000000 HC RX 637 (ALT 250 FOR IP): Performed by: PHYSICIAN ASSISTANT

## 2021-10-04 PROCEDURE — 2580000003 HC RX 258: Performed by: PHYSICIAN ASSISTANT

## 2021-10-04 PROCEDURE — 99239 HOSP IP/OBS DSCHRG MGMT >30: CPT | Performed by: PHYSICIAN ASSISTANT

## 2021-10-04 PROCEDURE — 6360000002 HC RX W HCPCS: Performed by: PHYSICIAN ASSISTANT

## 2021-10-04 RX ORDER — ZINC SULFATE 50(220)MG
50 CAPSULE ORAL DAILY
Qty: 30 CAPSULE | Refills: 3 | COMMUNITY
Start: 2021-10-05

## 2021-10-04 RX ORDER — ASCORBIC ACID 500 MG
500 TABLET ORAL DAILY
Qty: 30 TABLET | Refills: 3 | Status: SHIPPED | OUTPATIENT
Start: 2021-10-05

## 2021-10-04 RX ORDER — CHOLECALCIFEROL (VITAMIN D3) 50 MCG
2000 TABLET ORAL DAILY
Qty: 60 TABLET | Refills: 0 | Status: SHIPPED | OUTPATIENT
Start: 2021-10-05

## 2021-10-04 RX ORDER — DEXAMETHASONE 6 MG/1
6 TABLET ORAL
Qty: 7 TABLET | Refills: 0 | Status: SHIPPED | OUTPATIENT
Start: 2021-10-04 | End: 2021-10-11

## 2021-10-04 RX ADMIN — Medication 50 MG: at 07:49

## 2021-10-04 RX ADMIN — INSULIN LISPRO 9 UNITS: 100 INJECTION, SOLUTION INTRAVENOUS; SUBCUTANEOUS at 09:27

## 2021-10-04 RX ADMIN — BARICITINIB 4 MG: 2 TABLET, FILM COATED ORAL at 07:49

## 2021-10-04 RX ADMIN — SODIUM CHLORIDE, POTASSIUM CHLORIDE, SODIUM LACTATE AND CALCIUM CHLORIDE: 600; 310; 30; 20 INJECTION, SOLUTION INTRAVENOUS at 04:57

## 2021-10-04 RX ADMIN — OXYCODONE HYDROCHLORIDE AND ACETAMINOPHEN 500 MG: 500 TABLET ORAL at 07:49

## 2021-10-04 RX ADMIN — ENOXAPARIN SODIUM 30 MG: 30 INJECTION, SOLUTION INTRAVENOUS; SUBCUTANEOUS at 07:49

## 2021-10-04 RX ADMIN — AMLODIPINE BESYLATE 5 MG: 5 TABLET ORAL at 07:49

## 2021-10-04 RX ADMIN — LISINOPRIL 20 MG: 20 TABLET ORAL at 07:49

## 2021-10-04 RX ADMIN — VITAMIN D, TAB 1000IU (100/BT) 2000 UNITS: 25 TAB at 07:49

## 2021-10-04 RX ADMIN — INSULIN LISPRO 6 UNITS: 100 INJECTION, SOLUTION INTRAVENOUS; SUBCUTANEOUS at 13:43

## 2021-10-04 RX ADMIN — HYDROCHLOROTHIAZIDE 12.5 MG: 12.5 CAPSULE ORAL at 07:49

## 2021-10-04 ASSESSMENT — PAIN SCALES - GENERAL
PAINLEVEL_OUTOF10: 0
PAINLEVEL_OUTOF10: 0

## 2021-10-04 NOTE — PROGRESS NOTES
Educated on discharge instructions, medications, and follow up appointments. No further questions or concerns voiced. Discharged to home with wife and oxygen once, she arrives.

## 2021-10-04 NOTE — PROGRESS NOTES
A home oxygen evaluation has been completed. [x]Patient is an inpatient. It is expected that the patient will be discharged within the next 48 hours. Qualified provider to write order for home prescription if patient qualifies. Social service/care managers will arrange for home oxygen. If patient is active, arrange for Home Medical supplier to assess for Oxygen Conserving Device per pulse oximetry. []Patient is an outpatient. Results will be faxed to the ordering provider. Qualified provider to write order for home prescription if patient qualifies and arranges for home oxygen. Patient was placed on room air for 20 minutes. SpO2 was 90-94 % on room air at rest. Patients SpO2 was 89% or above and did not qualify for home oxygen. Patient was walked for 6 minutes. SpO2 was 88 % during walking. Patients SpO2 was below 89% and qualified for home oxygen. Oxygen was applied at 1 lpm via nasal cannula to maintain a SpO2 between 90-92% while walking. Actual SpO2 was 92 %. Patient walked at brisk pace with no breaks. Return to chair and SpO2 95% on room air. Note: For any SpO2 at 71% see policy and procedure for possible qualifications.

## 2021-10-04 NOTE — CARE COORDINATION
10/4/21, 7:25 AM EDT  DISCHARGE PLANNING EVALUATION:    Mckenzie Payne       Admitted: 10/2/2021/ [address] day: 2   Location: [address] [place] [address] Reason for admit: Acute hypoxemic respiratory failure due to COVID-19 ([facility] [U07.1, J96.01]   PMH:  has a past medical history of Anxiety, Diabetes mellitus ([facility] and Hypertension. Procedure: none  Barriers to Discharge:  CRP 8.99, -300s. 88-93% on 1L O2.  LR infusion, baricitinib, IV decadron, lovenox, Vit C/D/zinc.  IS, acapella. PCP: Phuong Head  Readmission Risk Score: 10%    Patient Goals/Plan/Treatment Preferences: Spoke with wife Almita Clinton, she states Melida Nicholas will return home at discharge. He is employed, independent, and does not require use of DME. If oxygen is needed, she does not have a preference as long as in network with their insurance. Denies further needs at this time. Transportation/Food Security/Housekeeping Addressed:  No issues identified.

## 2021-10-04 NOTE — DISCHARGE SUMMARY
Hospitalist Discharge Summary        Patient: Patria Maurice  YOB: 1985  MRN: [id number]   Acct: [de-identified]    Primary Care Physician: Amadeo Gilliam date  10/2/2021    Discharge date:  10/4/2021 2:18 PM    Chief Complaint on presentation :-  COVID-19    Discharge Assessment and Plan:-   1. COVID 19: Patient tested positive for COVID-19. Patient was afebrile on DC. Patient completed COVID-19 therapies: Baricitinib and Decadron. Continue Vitamin C, Vitamin D, Zinc.  Patient was prescribed the rest of Decadron treatment of a total of 10 days. 2. Acute respiratory failure, hypoxia: Patient's baseline is room air. The day of discharge, patient still required some oxygen support. Patient received a Home O2 eval, which showed that the patient required 0 L at rest and 1 L while exercising. 3. Essential hypertension : Blood pressure stable. Continue home medications. 4. Noninsulin-dependent diabetes: Blood sugars have been elevated due to steroids. Patient is aware. Patient will follow up with PCP. Continue home Metformin. Initial H and P and Hospital course:-   Patient was admitted on 10/2/2021. Patient received COVID therapies (Baricnitb & Decadron). Patient continued to improve and oxygen was weaned. However, Patient still required oxygen the day of discharge. Home O2 eval was completed. Patient received oxygen before discharge. Patient understood that she needed to call the health department to make sure that she was in compliance with quarantine regulations. On the day of discharge, it was explained to the patient that it was very important to follow up with his PCP to have continued care. Appointments were made and information was given.      Physical Exam:-  Vitals:   Patient Vitals for the past 24 hrs:   BP Temp Temp src Pulse Resp SpO2   10/04/21 1106 116/72 98 °F (36.7 °C) Oral 79 18 95 %   10/04/21 0747 (!) 146/87 97.8 °F (36.6 °C) Oral 68 20 92 %   10/04/21 8573 -- -- -- -- -- 93 %   10/04/21 0701 -- -- -- -- -- (!) 88 %   10/04/21 0404 120/70 98.2 °F (36.8 °C) Oral 67 16 93 %   10/03/21 2336 118/70 98.3 °F (36.8 °C) Oral 80 18 92 %   10/03/21 2118 -- -- -- -- -- 93 %   10/03/21 2100 136/88 98.1 °F (36.7 °C) Oral 77 18 93 %   10/03/21 1626 134/75 97.4 °F (36.3 °C) Oral 82 20 91 %     Weight:   Weight: 260 lb (117.9 kg)   24 hour intake/output:     Intake/Output Summary (Last 24 hours) at 10/4/2021 1418  Last data filed at 10/4/2021 1120  Gross per 24 hour   Intake 1966 ml   Output --   Net 1966 ml       5. General appearance: No apparent distress, appears stated age and cooperative. 6. HEENT: Normal cephalic, atraumatic without obvious deformity. Pupils equal, round, and reactive to light. Extra ocular muscles intact. Conjunctivae/corneas clear. 7. Neck: Supple, with full range of motion. No jugular venous distention. Trachea midline. 8. Respiratory:  Normal respiratory effort. Clear to auscultation, bilaterally without Rales/Wheezes/Rhonchi. 9. Cardiovascular: Regular rate and rhythm with normal S1/S2 without murmurs, rubs or gallops. 10. Abdomen: Soft, non-tender, non-distended with normal bowel sounds. 11. Musculoskeletal:  No clubbing, cyanosis or edema bilaterally. 12. Skin: Skin color, texture, turgor normal.  No rashes or lesions. 13. Neurologic:  Neurovascularly intact without any focal sensory/motor deficits. Cranial nerves: II-XII intact, grossly non-focal.  14. Psychiatric: Alert and oriented, thought content appropriate, normal insight  15. Capillary Refill: Brisk,< 3 seconds   16.  Peripheral Pulses: +2 palpable, equal bilaterally       Discharge Medications:-      Medication List      START taking these medications    ascorbic acid 500 MG tablet  Commonly known as: VITAMIN C  Take 1 tablet by mouth daily  Start taking on: October 5, 2021     dexamethasone 6 MG tablet  Commonly known as: DECADRON  Take 1 tablet by mouth daily (with breakfast) for 7 days     vitamin D 50 MCG (2000 UT) Tabs tablet  Commonly known as: CHOLECALCIFEROL  Take 1 tablet by mouth daily  Start taking on: October 5, 2021     zinc sulfate 220 (50 Zn) MG capsule  Commonly known as: ZINCATE  Take 1 capsule by mouth daily  Start taking on: October 5, 2021        Beny Rodas taking these medications    amLODIPine 5 MG tablet  Commonly known as: NORVASC     escitalopram 20 MG tablet  Commonly known as: LEXAPRO     lisinopril-hydroCHLOROthiazide 20-12.5 MG per tablet  Commonly known as: PRINZIDE;ZESTORETIC     metFORMIN 1000 MG tablet  Commonly known as: GLUCOPHAGE           Where to Get Your Medications      These medications were sent to [address] [place] [address] , [address] [place] [address]    Phone: [phone]   · ascorbic acid 500 MG tablet  · dexamethasone 6 MG tablet  · vitamin D 50 MCG (2000 UT) Tabs tablet     You can get these medications from any pharmacy    You don't need a prescription for these medications  · zinc sulfate 220 (50 Zn) MG capsule          Labs :-  Recent Results (from the past 72 hour(s))   EKG 12 Lead    Collection Time: 10/02/21 11:36 AM   Result Value Ref Range    Ventricular Rate 104 BPM    Atrial Rate 104 BPM    P-R Interval 144 ms    QRS Duration 98 ms    Q-T Interval 344 ms    QTc Calculation (Bazett) 452 ms    P Axis 38 degrees    R Axis -13 degrees    T Axis 5 degrees   Basic Metabolic Panel w/ Reflex to MG    Collection Time: 10/02/21 11:59 AM   Result Value Ref Range    Sodium 136 135 - 145 meq/L    Potassium reflex Magnesium 3.6 3.5 - 5.2 meq/L    Chloride 97 (L) 98 - 111 meq/L    CO2 25 23 - 33 meq/L    Glucose 212 (H) 70 - 108 mg/dL    BUN 20 7 - 22 mg/dL    CREATININE 0.9 0.4 - 1.2 mg/dL    Calcium 9.2 8.5 - 10.5 mg/dL   Brain Natriuretic Peptide    Collection Time: 10/02/21 11:59 AM   Result Value Ref Range    Pro-BNP < 5.0 0.0 - 450.0 pg/mL   CBC Auto Differential    Collection Time: 10/02/21 11:59 AM   Result Value Ref Range    WBC 7.6 4.8 - 10.8 thou/mm3    RBC 4.73 4.70 - 6.10 mill/mm3    Hemoglobin 14.9 14.0 - 18.0 gm/dl    Hematocrit 43.2 42.0 - 52.0 %    MCV 91.3 80.0 - 94.0 fL    MCH 31.5 26.0 - 33.0 pg    MCHC 34.5 32.2 - 35.5 gm/dl    RDW-CV 12.3 11.5 - 14.5 %    RDW-SD 41.6 35.0 - 45.0 fL    Platelets 414 247 - 786 thou/mm3    MPV 9.3 (L) 9.4 - 12.4 fL    Seg Neutrophils 79.7 %    Lymphocytes 13.0 %    Monocytes 6.5 %    Eosinophils 0.0 %    Basophils 0.4 %    Immature Granulocytes 0.4 %    Segs Absolute 6.1 1 - 7 thou/mm3    Lymphocytes Absolute 1.0 1.0 - 4.8 thou/mm3    Monocytes Absolute 0.5 0.4 - 1.3 thou/mm3    Eosinophils Absolute 0.0 0.0 - 0.4 thou/mm3    Basophils Absolute 0.0 0.0 - 0.1 thou/mm3    Immature Grans (Abs) 0.03 0.00 - 0.07 thou/mm3    nRBC 0 /100 wbc   Troponin    Collection Time: 10/02/21 11:59 AM   Result Value Ref Range    Troponin T < 0.010 ng/ml   Ferritin    Collection Time: 10/02/21 11:59 AM   Result Value Ref Range    Ferritin 2,468 (H) 22 - 322 ng/mL   C-Reactive Protein    Collection Time: 10/02/21 11:59 AM   Result Value Ref Range    CRP 8.99 (H) 0.00 - 1.00 mg/dl   Hepatic Function Panel    Collection Time: 10/02/21 11:59 AM   Result Value Ref Range    Albumin 4.0 3.5 - 5.1 g/dL    Total Bilirubin 0.6 0.3 - 1.2 mg/dL    Bilirubin, Direct <0.2 0.0 - 0.3 mg/dL    Alkaline Phosphatase 57 38 - 126 U/L    AST 52 (H) 5 - 40 U/L    ALT 65 11 - 66 U/L    Total Protein 7.3 6.1 - 8.0 g/dL   Anion Gap    Collection Time: 10/02/21 11:59 AM   Result Value Ref Range    Anion Gap 14.0 8.0 - 16.0 meq/L   Osmolality    Collection Time: 10/02/21 11:59 AM   Result Value Ref Range    Osmolality Calc 280.9 275.0 - 300.0 mOsmol/kg   Glomerular Filtration Rate, Estimated    Collection Time: 10/02/21 11:59 AM   Result Value Ref Range    Est, Glom Filt Rate >90 ml/min/1.73m2   Lactate Dehydrogenase    Collection Time: 10/02/21 11:59 AM   Result Value Ref Range     (H) 100 - 190 U/L   COVID-19, Rapid    Collection Time: 10/02/21  1:15 PM    Specimen: Nasopharyngeal Swab   Result Value Ref Range    SARS-CoV-2, NAAT NOT DETECTED NOT DETECTED   Rapid influenza A/B antigens    Collection Time: 10/02/21  2:10 PM    Specimen: Nares   Result Value Ref Range    Flu A Antigen Negative NEGATIVE    Flu B Antigen Negative NEGATIVE   COVID-19    Collection Time: 10/02/21  5:10 PM    Specimen: Nasopharyngeal Swab   Result Value Ref Range    Source NP swab    COVID-19, Rapid    Collection Time: 10/02/21  5:10 PM   Result Value Ref Range    SARS-CoV-2, NAAT DETECTED (AA) NOT DETECTED   Procalcitonin    Collection Time: 10/02/21  5:34 PM   Result Value Ref Range    Procalcitonin 0.13 (H) 0.01 - 0.09 ng/mL   Lactic acid, plasma    Collection Time: 10/02/21  5:34 PM   Result Value Ref Range    Lactic Acid 1.3 0.5 - 2.0 mmol/L   POCT Glucose    Collection Time: 10/02/21  5:36 PM   Result Value Ref Range    POC Glucose 211 (H) 70 - 108 mg/dl   POCT Glucose    Collection Time: 10/02/21  9:25 PM   Result Value Ref Range    POC Glucose 337 (H) 70 - 108 mg/dl   CBC Auto Differential    Collection Time: 10/03/21  6:47 AM   Result Value Ref Range    WBC 4.0 (L) 4.8 - 10.8 thou/mm3    RBC 4.79 4.70 - 6.10 mill/mm3    Hemoglobin 15.0 14.0 - 18.0 gm/dl    Hematocrit 45.0 42.0 - 52.0 %    MCV 93.9 80.0 - 94.0 fL    MCH 31.3 26.0 - 33.0 pg    MCHC 33.3 32.2 - 35.5 gm/dl    RDW-CV 12.2 11.5 - 14.5 %    RDW-SD 42.4 35.0 - 45.0 fL    Platelets 607 177 - 973 thou/mm3    MPV 10.1 9.4 - 12.4 fL    Seg Neutrophils 64.9 %    Lymphocytes 26.4 %    Monocytes 7.0 %    Eosinophils 0.0 %    Basophils 0.2 %    Immature Granulocytes 1.5 %    Platelet Estimate ADEQUATE Adequate    Segs Absolute 2.6 1 - 7 thou/mm3    Lymphocytes Absolute 1.1 1.0 - 4.8 thou/mm3    Monocytes Absolute 0.3 (L) 0.4 - 1.3 thou/mm3    Eosinophils Absolute 0.0 0.0 - 0.4 thou/mm3    Basophils Absolute 0.0 0.0 - 0.1 thou/mm3 Immature Grans (Abs) 0.06 0.00 - 0.07 thou/mm3    nRBC 0 /100 wbc   Scan of Blood Smear    Collection Time: 10/03/21  6:47 AM   Result Value Ref Range    SCAN OF BLOOD SMEAR see below    POCT Glucose    Collection Time: 10/03/21  8:13 AM   Result Value Ref Range    POC Glucose 243 (H) 70 - 108 mg/dl   POCT Glucose    Collection Time: 10/03/21 12:53 PM   Result Value Ref Range    POC Glucose 223 (H) 70 - 108 mg/dl   POCT Glucose    Collection Time: 10/03/21  5:29 PM   Result Value Ref Range    POC Glucose 295 (H) 70 - 108 mg/dl   POCT Glucose    Collection Time: 10/03/21  8:26 PM   Result Value Ref Range    POC Glucose 347 (H) 70 - 108 mg/dl   POCT Glucose    Collection Time: 10/04/21  8:14 AM   Result Value Ref Range    POC Glucose 288 (H) 70 - 108 mg/dl   POCT Glucose    Collection Time: 10/04/21 12:14 PM   Result Value Ref Range    POC Glucose 216 (H) 70 - 108 mg/dl      Radiology:-  XR CHEST PORTABLE    Result Date: 10/2/2021  PROCEDURE: XR CHEST PORTABLE CLINICAL INFORMATION: sob. COMPARISON: CTA chest dated 8/13/2013. TECHNIQUE: AP upright view of the chest. FINDINGS: There are faint patchy opacities at the periphery of the lungs bilaterally. Lungs otherwise appear clear. There is eventration of the right hemidiaphragm, stable compared to prior CT. The pulmonary vasculature and cardiomediastinal silhouette are within normal limits. Visualized osseous structures appear grossly intact. Peripheral patchy opacities as evidence for atypical infectious/inflammatory process to include viral pneumonia. **This report has been created using voice recognition software. It may contain minor errors which are inherent in voice recognition technology. ** Final report electronically signed by Dr. Agnieszka Davidson MD on 10/2/2021 12:31 PM       Follow-up scheduled after discharge :-    in the next few days with Cr Riddle     Consultations during this hospital stay:-  [] NONE [] Cardiology  [] Nephrology  [] Hemo onco   [] GI   [] ID  [] Endocrine  [] Pulm    [] Neuro    [] Psych   [] Urology  [] ENT   [] G SURGERY   []Ortho    []CV surg    [] Palliative  [] Hospice [] Pain management   []    []TCU   [] PT/OT  OTHERS:-    Disposition: home  Condition at Discharge: Stable    Time Spent:- 45 minutes    Electronically signed by MARLY Kumar on 10/4/21 at 2:18 PM EDT   Discharging Hospitalist

## 2021-10-04 NOTE — CARE COORDINATION
10/4/21, 1:58 PM EDT    Discharge ordered. [facility] will return home with wife and kids. Oxygen ordered from [facility]. Patient goals/plan/ treatment preferences discussed by  and . Patient goals/plan/ treatment preferences reviewed with patient/ family. Patient/ family verbalize understanding of discharge plan and are in agreement with goal/plan/treatment preferences. Understanding was demonstrated using the teach back method. AVS provided by RN at time of discharge, which includes all necessary medical information pertaining to the patients current course of illness, treatment, post-discharge goals of care, and treatment preferences.

## 2021-10-04 NOTE — PROGRESS NOTES
Patient was evaluated today for the diagnosis of COVID-19. I entered a DME order for home oxygen because the diagnosis and testing requires the patient to have supplemental oxygen. Condition will improve or be benefited by oxygen use. The patient is  able to perform good mobility in a home setting and therefore does require the use of a portable oxygen system. The need for this equipment was discussed with the patient and he understands and is in agreement.     Electronically signed by MARLY Villafana on 10/4/2021 at 1:41 PM

## 2021-10-05 ENCOUNTER — CARE COORDINATION (OUTPATIENT)
Dept: CASE MANAGEMENT | Age: 36
End: 2021-10-05

## 2021-10-05 DIAGNOSIS — U07.1 ACUTE HYPOXEMIC RESPIRATORY FAILURE DUE TO COVID-19 (HCC): Primary | ICD-10-CM

## 2021-10-05 DIAGNOSIS — J96.01 ACUTE HYPOXEMIC RESPIRATORY FAILURE DUE TO COVID-19 (HCC): Primary | ICD-10-CM

## 2021-10-05 NOTE — PROGRESS NOTES
CLINICAL PHARMACY NOTE: MEDS TO BEDS    Total # of Prescriptions Filled: 3   The following medications were delivered to the patient:  Ascorbic Acid 500mg  Dexamethasone 6mg  Vitamin D3 50mcg    Additional Documentation:

## 2021-10-05 NOTE — CARE COORDINATION
Patient contacted regarding COVID-19 risk, exposure, diagnosis, pulse oximeter ordered at discharge and monoclonal antibody infusion follow up. Discussed COVID-19 related testing which was available at this time. Test results were positive. Patient informed of results, if available? Yes. Care Transition Nurse contacted the patient by telephone to perform post discharge assessment. Call within 2 business days of discharge: Yes. Verified name and  with patient as identifiers. Provided introduction to self, and explanation of the CTN/ACM role, and reason for call due to risk factors for infection and/or exposure to COVID-19. Symptoms reviewed with patient who verbalized the following symptoms: fatigue, cough and shortness of breath. Due to no new or worsening symptoms encounter was not routed to provider for escalation. Discussed follow-up appointments. If no appointment was previously scheduled, appointment scheduling offered: Yes. [address] follow up appointment(s): No future appointments. Non-[facility] follow up appointment(s): Meagan Vance 10/8/21 [place]    Non-face-to-face services provided:  Scheduled appointment with PCP-10/8/21     Advance Care Planning:   Does patient have an Advance Directive:  not on file. Educated patient about risk for severe COVID-19 due to risk factors according to CDC guidelines. CTN reviewed discharge instructions, medical action plan and red flag symptoms with the patient who verbalized understanding. Discussed COVID vaccination status: No. Education provided on COVID-19 vaccination as appropriate. Discussed exposure protocols and quarantine with CDC Guidelines. Patient was given an opportunity to verbalize any questions and concerns and agrees to contact CTN or health care provider for questions related to their healthcare. Reviewed and educated patient on any new and changed medications related to discharge diagnosis     Was patient discharged with a pulse oximeter?  Yes Discussed and confirmed pulse oximeter discharge instructions and when to notify provider or seek emergency care. O2 @ 1l/min when active pulse ox=92% at rest drops to 88% w/activity. CTN provided contact information. Plan for follow-up call in 5-7 days based on severity of symptoms and risk factors.

## 2021-10-06 LAB
QUANTI TB GOLD PLUS: NORMAL
QUANTI TB1 MINUS NIL: 0 IU/ML (ref 0–0.34)
QUANTI TB2 MINUS NIL: 0 IU/ML (ref 0–0.34)
QUANTIFERON MITOGEN MINUS NIL: 0.27 IU/ML
QUANTIFERON NIL: 0.01 IU/ML

## 2021-10-12 ENCOUNTER — CARE COORDINATION (OUTPATIENT)
Dept: CASE MANAGEMENT | Age: 36
End: 2021-10-12

## 2021-10-12 NOTE — CARE COORDINATION
Discussed exposure protocols and quarantine with CDC Guidelines. Patient was given an opportunity to verbalize any questions and concerns and agrees to contact CTN or health care provider for questions related to their healthcare. Was patient discharged with a pulse oximeter? No Discussed and confirmed pulse oximeter discharge instructions and when to notify provider or seek emergency care. CTN provided contact information. Plan for follow-up call in 5-7 days based on severity of symptoms and risk factors. Care Transitions Subsequent and Final Call    Subsequent and Final Calls  Do you have any ongoing symptoms?: Yes  Patient-reported symptoms: Cough  Have your medications changed?: No  Do you have any questions related to your medications?: No  Do you currently have any active services?: No  Do you have any needs or concerns that I can assist you with?: No  Identified Barriers: None  Care Transitions Interventions  Other Interventions: Follow Up  No future appointments.     Bro Dinh RN

## 2021-10-19 ENCOUNTER — CARE COORDINATION (OUTPATIENT)
Dept: CASE MANAGEMENT | Age: 36
End: 2021-10-19

## 2021-10-19 NOTE — CARE COORDINATION
Care Transitions Outreach Attempt    Call within 2 business days of discharge: Yes   Attempted to reach patient for transitions of care follow up. Unable to reach patient. Lm to CB  Patient: Philip Heath Patient : 1985 MRN: [id number]    Last Discharge [facility]       Complaint Diagnosis Description Type Department Provider    10/2/21 Shortness of Breath; Concern For COVID-19 Acute hypoxemic respiratory failure due to COVID-19 [facility]) ED to Hosp-Admission (Discharged) (ADMITTED) [facility] Gregor Osgood, PA; Alcira Canales. .. Noted following upcoming appointments from discharge chart review:   [id number] follow up appointment(s): No future appointments.   Non[facility] follow up appointment(s):

## 2021-10-20 ENCOUNTER — CARE COORDINATION (OUTPATIENT)
Dept: CASE MANAGEMENT | Age: 36
End: 2021-10-20

## 2022-07-08 ENCOUNTER — HOSPITAL ENCOUNTER (OUTPATIENT)
Dept: NON INVASIVE DIAGNOSTICS | Age: 37
Discharge: HOME | End: 2022-07-08
Payer: COMMERCIAL

## 2022-07-08 DIAGNOSIS — R00.0 TACHYCARDIA, UNSPECIFIED: ICD-10-CM

## 2022-07-08 LAB
LV EF: 60 %
LVEF MODALITY: NORMAL

## 2022-07-08 PROCEDURE — 93226 XTRNL ECG REC<48 HR SCAN A/R: CPT

## 2022-07-08 PROCEDURE — 93225 XTRNL ECG REC<48 HRS REC: CPT

## 2022-07-08 PROCEDURE — 93306 TTE W/DOPPLER COMPLETE: CPT

## 2022-07-08 NOTE — PROGRESS NOTES
Echocardiogram order changed from limited to full study per written order. Explained to patient about the possible change in estimate.

## 2022-07-14 LAB
ACQUISITION DURATION: NORMAL S
AVERAGE HEART RATE: 102 BPM
HOOKUP DATE: NORMAL
HOOKUP TIME: NORMAL
MAX HEART RATE TIME/DATE: NORMAL
MAX HEART RATE: 127 BPM
MIN HEART RATE TIME/DATE: NORMAL
MIN HEART RATE: 73 BPM
NUMBER OF QRS COMPLEXES: NORMAL
NUMBER OF SUPRAVENTRICULAR COUPLETS: 0
NUMBER OF SUPRAVENTRICULAR ECTOPICS: 18
NUMBER OF SUPRAVENTRICULAR ISOLATED BEATS: 18
NUMBER OF VENTRICULAR BIGEMINAL CYCLES: 0
NUMBER OF VENTRICULAR COUPLETS: 0
NUMBER OF VENTRICULAR ECTOPICS: 0

## 2022-07-15 ENCOUNTER — TELEPHONE (OUTPATIENT)
Dept: FAMILY MEDICINE CLINIC | Age: 37
End: 2022-07-15

## 2022-07-15 NOTE — TELEPHONE ENCOUNTER
Husam Mathew MA   7/14/2022 11:26 AM EDT Back to Top        Called patient to schedule follow up appointment with Dr. Brandy Mcintyre, no answer. Left message requesting patient to call our office back!

## 2022-07-15 NOTE — TELEPHONE ENCOUNTER
----- Message from Sanjuanita Brock MD sent at 7/14/2022  7:38 AM EDT -----  Notify patient-covering inbox for Dr. Jamaica Chau in his absence  Results reviewed. CONCLUSION:   Normal sinus rhythm  predominant sinus tachycardia average HR 102BPM  No other form of arrhythmia noted. Confirmed by Maik Falcon ([id number]) on 7/14/2022 7:19:19 AM    Would recommend appointment with Dr. Jamaica Chau to discuss results and addition of possible beta-blocker to help with sinus tachycardia.   ES

## 2022-07-15 NOTE — TELEPHONE ENCOUNTER
I called and spoke to the patient. I let him know Dr. Lawrence Diaz response.   The patient scheduled an appointment to do a VV with Dr. Brandy Mcintyre on 07- at 3:30 pm.

## 2022-07-26 ENCOUNTER — TELEMEDICINE (OUTPATIENT)
Dept: FAMILY MEDICINE CLINIC | Age: 37
End: 2022-07-26
Payer: COMMERCIAL

## 2022-07-26 DIAGNOSIS — R00.0 TACHYCARDIA: Primary | ICD-10-CM

## 2022-07-26 PROCEDURE — 99442 PR PHYS/QHP TELEPHONE EVALUATION 11-20 MIN: CPT | Performed by: FAMILY MEDICINE

## 2022-08-04 NOTE — PROGRESS NOTES
Attempt to do telehealth with the patient but due to technical difficulties on his and we are unable to complete this visit as a telehealth visit. We switched over to a telephone visit. Patient had some tachycardia since he had COVID. He is working the normal schedule now. Denies any chest pain or shortness of breath does notice tachycardia occasionally it can be at rest or with activity. Holter monitor showed some sinus tachycardia but nothing concerning. Cardiac echo completely unremarkable. Offered patient further testing with exercise stress test.  Which he declined at this time the risk of this was reviewed. Medications for treatment of tachycardia after COVID discussed. Risk/benefit of this reviewed. Patient this time and decides to go without further treatment and follow for now. Plan is to try to do a follow-up visit in the next couple months. Time spent on phone call was 15 minutes.